# Patient Record
Sex: MALE | Race: WHITE
[De-identification: names, ages, dates, MRNs, and addresses within clinical notes are randomized per-mention and may not be internally consistent; named-entity substitution may affect disease eponyms.]

---

## 2017-02-08 ENCOUNTER — HOSPITAL ENCOUNTER (EMERGENCY)
Dept: HOSPITAL 74 - FER | Age: 82
Discharge: HOME | End: 2017-02-08
Payer: COMMERCIAL

## 2017-02-08 VITALS — TEMPERATURE: 98.4 F | DIASTOLIC BLOOD PRESSURE: 70 MMHG | HEART RATE: 95 BPM | SYSTOLIC BLOOD PRESSURE: 132 MMHG

## 2017-02-08 VITALS — BODY MASS INDEX: 25.5 KG/M2

## 2017-02-08 DIAGNOSIS — I10: ICD-10-CM

## 2017-02-08 DIAGNOSIS — J45.909: ICD-10-CM

## 2017-02-08 DIAGNOSIS — S09.90XA: Primary | ICD-10-CM

## 2017-02-08 DIAGNOSIS — Z79.82: ICD-10-CM

## 2017-02-08 DIAGNOSIS — Z87.891: ICD-10-CM

## 2017-02-08 DIAGNOSIS — I51.9: ICD-10-CM

## 2017-02-08 DIAGNOSIS — Z85.038: ICD-10-CM

## 2017-02-08 NOTE — PDOC
[id number]


FALL,HIT HEAD


Time Seen by Provider: 02/08/17 11:27


History Source: Patient


Exam Limitations: No Limitations





- History of Present Illness


Initial Comments: 


87 yo M presents s/p mechanical trip and fall from standing. He states that he 

lost his footing. He hit the back of his head, no LOC. Denies headache, weakness

, numbness, vision changes. He also notes that his knees are sore B/L, but he 

has been walking without difficulty. He cannot recall his medications, but 

chart review shows aspirin 81 mg daily. 











Past History





- Past Medical History


Allergies/Adverse Reactions: 


 Allergies











Allergy/AdvReac Type Severity Reaction Status Date / Time


 


codeine AdvReac   Verified 02/08/17 11:22











Home Medications: 


Ambulatory Orders





Aspirin [ASA -] 81 mg PO DAILY 05/30/16 


Donepezil HCl [Aricept] 10 mg PO DAILY 05/30/16 


Losartan 50Mg/Hctz 12.5MG [Hyzaar -] 1 tab PO DAILY 05/30/16 


Omeprazole [Prilosec] 40 mg PO DAILY 05/30/16 


Paroxetine HCl [Paxil -] 10 mg PO DAILY 05/30/16 








Asthma: Yes


Cancer: Yes (COLON)


Cardiac Disorders: Yes


HTN: Yes





- Immunization History


Immunization Up to Date: Yes





- Psycho/Social/Smoking Cessation Hx


Anxiety: No


Suicidal Ideation: No


Smoking History: Former smoker


Have you smoked in the past 12 months: No


If you are a former smoker, when did you quit?: 1970S


Information on smoking cessation initiated: No


Hx Alcohol Use: No


Drug/Substance Use Hx: No


Substance Use Type: None





**Review of Systems





- Review of Systems


Able to Perform ROS?: Yes


Comments:: 


GENERAL: Awake, alert, and fully oriented, in no acute distress


HEAD: No signs of trauma


EYES: PERRLA, EOMI, sclera anicteric, conjunctiva clear


ENT: Auricles normal inspection, hearing grossly normal, nares patent, 

oropharynx clear without exudates. Moist mucosa


NECK: Normal ROM, supple, no lymphadenopathy, JVD, or masses


LUNGS: Breath sounds equal, clear to auscultation bilaterally.  No wheezes, and 

no crackles


HEART: Regular rate and rhythm, normal S1 and S2, no murmurs, rubs or gallops


ABDOMEN: Soft, nontender, normoactive bowel sounds.  No guarding, no rebound.  

No masses


EXTREMITIES: Normal range of motion, no edema.  No clubbing or cyanosis. No 

cords, erythema, or tenderness


NEUROLOGICAL: Cranial nerves II through XII grossly intact.  Normal speech, 

normal gait


SKIN: Warm, Dry, normal turgor, no rashes or lesions noted.








*Physical Exam





- Vital Signs


 Last Vital Signs











Temp Pulse Resp BP Pulse Ox


 


 98.4 F   95 H  20   132/70   96 


 


 02/08/17 11:21  02/08/17 11:21  02/08/17 11:21  02/08/17 11:21  02/08/17 11:21














- Physical Exam


Comments: 


GENERAL: Awake, alert, and fully oriented, in no acute distress


HEAD: No signs of trauma


EYES: PERRLA, EOMI, sclera anicteric, conjunctiva clear


ENT: Auricles normal inspection, hearing grossly normal, nares patent, 

oropharynx clear without exudates. Moist mucosa


NECK: Normal ROM, supple, no lymphadenopathy, JVD, or masses


LUNGS: Breath sounds equal, clear to auscultation bilaterally.  No wheezes, and 

no crackles


HEART: Regular rate and rhythm, normal S1 and S2, no murmurs, rubs or gallops


ABDOMEN: Soft, nontender, normoactive bowel sounds.  No guarding, no rebound.  

No masses


EXTREMITIES: Normal range of motion, no edema.  No clubbing or cyanosis. No 

cords, erythema, or tenderness


NEUROLOGICAL: Cranial nerves II through XII grossly intact.  Normal speech. 

Uses a crutch to assist with walking.


SKIN: Warm, Dry, normal turgor, no rashes or lesions noted.








Medical Decision Making





- Medical Decision Making


CT Head- no acute findings. Stable for DC home. 








*DC/Admit/Observation/Transfer


Diagnosis at time of Disposition: 


Head injury


Qualifiers:


 Encounter type: initial encounter Qualified Code(s): S09.90XA - Unspecified 

injury of head, initial encounter





- Discharge Dispostion


Disposition: HOME


Condition at time of disposition: Stable


Admit: No





- Patient Instructions


Printed Discharge Instructions:  DI for Closed Head Injury

## 2018-01-16 ENCOUNTER — HOSPITAL ENCOUNTER (EMERGENCY)
Dept: HOSPITAL 74 - FER | Age: 83
Discharge: HOME | End: 2018-01-16
Payer: COMMERCIAL

## 2018-01-16 VITALS — BODY MASS INDEX: 32.8 KG/M2

## 2018-01-16 VITALS — TEMPERATURE: 98.2 F | DIASTOLIC BLOOD PRESSURE: 70 MMHG | HEART RATE: 66 BPM | SYSTOLIC BLOOD PRESSURE: 152 MMHG

## 2018-01-16 DIAGNOSIS — J45.909: ICD-10-CM
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
## 2018-01-16 DIAGNOSIS — I10: ICD-10-CM

## 2018-01-16 DIAGNOSIS — Z85.038: ICD-10-CM
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
## 2018-01-16 DIAGNOSIS — Z87.891: ICD-10-CM

## 2018-01-16 DIAGNOSIS — S09.90XA: Primary | ICD-10-CM
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
## 2018-01-16 PROCEDURE — 3E0234Z INTRODUCTION OF SERUM, TOXOID AND VACCINE INTO MUSCLE, PERCUTANEOUS APPROACH: ICD-10-PCS

## 2018-01-16 NOTE — PDOC
History of Present Illness





- General


History Source: Patient (Mechanical fall on steps after tripping on rock.  )


Exam Limitations: No Limitations





- History of Present Illness


Severity: moderate





<Jose Cruz Kumari - Last Filed: 01/16/18 16:23>





- General


History Source: Patient





<Darryn Desir - Last Filed: 01/16/18 17:03>





- General


Chief Complaint: Injury


Stated Complaint: FALL, HIT HEAD


Time Seen by Provider: 01/16/18 15:21





Past History





- Surgical History


Orthopedic Surgery: Yes (Bilateral knee replacement. )





<Jose Cruz Kumari - Last Filed: 01/16/18 16:23>





- Past Medical History


Asthma: Yes


Cancer: Yes (COLON)


Cardiac Disorders: Yes


HTN: Yes





- Immunization History


Immunization Up to Date: Yes





- Suicide/Smoking/Psychosocial Hx


Smoking History: Former smoker


Have you smoked in the past 12 months: No


If you are a former smoker, when did you quit?: 1970S


Hx Alcohol Use: No


Drug/Substance Use Hx: No


Substance Use Type: None





<Darryn Deisr - Last Filed: 01/16/18 17:03>





- Past Medical History


Allergies/Adverse Reactions: 


 Allergies











Allergy/AdvReac Type Severity Reaction Status Date / Time


 


codeine AdvReac   Verified 01/16/18 15:21











Home Medications: 


Ambulatory Orders





Aspirin [ASA -] 81 mg PO DAILY 05/30/16 


Donepezil HCl [Aricept] 10 mg PO DAILY 05/30/16 


Losartan 50Mg/Hctz 12.5MG [Hyzaar -] 1 tab PO DAILY 05/30/16 


Omeprazole [Prilosec] 40 mg PO DAILY 05/30/16 


Paroxetine HCl [Paxil -] 10 mg PO DAILY 05/30/16 











**Review of Systems





- Review of Systems


HEENTM: Yes: Other (Mid forehead laceration. )


All Other Systems: Reviewed and Negative





<Jose Cruz Kumari - Last Filed: 01/16/18 16:23>





*Physical Exam





- Physical Exam


HEENT: positive: Normal ENT Inspection, Other (Small Forehead laceration. )


Respiratory/Chest: positive: Lungs Clear


Cardiovascular: positive: Regular Rhythm, Regular Rate


Extremity: positive: Normal Range of Motion


Integumentary: positive: Normal Color, Dry, Warm


Neurologic: positive: CNs II-XII NML intact, Motor Strength 5/5, Other (

Slightly forgetfull. )





<Jose Cruz Kumari - Last Filed: 01/16/18 16:23>





*DC/Admit/Observation/Transfer





- Attestations


Scribe Attestion: 





01/16/18 16:29





Documentation prepared by Jose Cruz Kumari, acting as medical scribe for Darryn Desir MD/DO.





<Jose Cruz Kumari - Last Filed: 01/16/18 16:23>





- Discharge Dispostion


Admit: No





<Darryn Desir - Last Filed: 01/16/18 17:03>


Diagnosis at time of Disposition: 


Head injury


Qualifiers:


 Encounter type: initial encounter Qualified Code(s): S09.90XA - Unspecified 

injury of head, initial encounter








- Discharge Dispostion


Disposition: HOME


Condition at time of disposition: Stable





- Patient Instructions


Printed Discharge Instructions:  DI for Closed Head Injury, Diphtheria, Tetanus

, and Pertussis Vaccine

## 2018-01-18 NOTE — EKG
Test Reason : 

Blood Pressure : ***/*** mmHG

Vent. Rate : 057 BPM     Atrial Rate : 057 BPM

   P-R Int : 134 ms          QRS Dur : 090 ms

    QT Int : 398 ms       P-R-T Axes : -12 034 -01 degrees

   QTc Int : 387 ms

 

SINUS BRADYCARDIA WITH OCCASIONAL PREMATURE VENTRICULAR COMPLEXES

LOW VOLTAGE QRS

?  INFERIOR-POSTERIOR INFARCT , AGE UNDETERMINED

NO PREVIOUS ECGS AVAILABLE

Confirmed by MD ZURITA MARJORY ([id number]) on 1/18/2018 5:42:05 PM

 

Referred By:  DR. ELIAS           Confirmed By:KATHRYN ZURITA MD

## 2018-02-24 ENCOUNTER — HOSPITAL ENCOUNTER (EMERGENCY)
Dept: HOSPITAL 74 - FER | Age: 83
Discharge: HOME | End: 2018-02-24
Payer: COMMERCIAL

## 2018-02-24 VITALS — TEMPERATURE: 98.3 F | DIASTOLIC BLOOD PRESSURE: 65 MMHG | HEART RATE: 65 BPM | SYSTOLIC BLOOD PRESSURE: 152 MMHG

## 2018-02-24 VITALS — BODY MASS INDEX: 28.5 KG/M2

## 2018-02-24 DIAGNOSIS — I48.91: ICD-10-CM

## 2018-02-24 DIAGNOSIS — J45.909: ICD-10-CM

## 2018-02-24 DIAGNOSIS — E78.00: ICD-10-CM

## 2018-02-24 DIAGNOSIS — Z85.038: ICD-10-CM

## 2018-02-24 DIAGNOSIS — G89.29: ICD-10-CM

## 2018-02-24 DIAGNOSIS — M54.42: Primary | ICD-10-CM

## 2018-02-24 DIAGNOSIS — I10: ICD-10-CM

## 2018-02-24 NOTE — PDOC
History of Present Illness





- History of Present Illness


Initial Comments: 





02/24/18 20:40


Patient is an 89 M, with PMHx of chronic back pain, HTN, early signs of dementia

, colon and prostate cancer, who presents today for intermittent back pain  for 

5 days. Patient had back surgery 15 years ago and states that he gets 

occasional inflammation and pain in his back. On Tuesday his daughter states 

that he had some back pain, was better on Thursday, but has progressively 

worsened since then. He describes his back pain as left-sided, sudden onset, 

severe, radiating down his leg. His daughter states that today her mother 

called her stating that he was unable to get him into bed as well as trouble 

going from a sitting to a standing position due to his back pain. He rates his 

current back pain 3-4/10. Daughter gave patient Tylenol for the pain.  He has 

an appointment on Friday to see his pain management doctor. 





Surgical Hx: Back Sx - 15 years ago L1-S1 karen and screw placement. 











<Lorene Rooney - Last Filed: 02/24/18 20:40>





<Carmelo Olivares - Last Filed: 02/24/18 21:34>





- General


Chief Complaint: Back Pain


Stated Complaint: BACK PAIN


Time Seen by Provider: 02/24/18 20:14





Past History





<Lorene Rooney - Last Filed: 02/24/18 20:40>





- Past Medical History


Asthma: Yes


Cancer: Yes (COLON,PROSTATE)


Cardiac Disorders: Yes (AFIB)


COPD: No


Dementia: Yes


HTN: Yes


Hypercholesterolemia: Yes


Other medical history: GLAUCOMA





- Surgical History


Orthopedic Surgery: Yes (Bilateral knee replacement. )





- Immunization History


Immunization Up to Date: Yes





- Suicide/Smoking/Psychosocial Hx


Smoking History: Former smoker


Have you smoked in the past 12 months: No


If you are a former smoker, when did you quit?: 1970S


Information on smoking cessation initiated: No


Hx Alcohol Use: No


Drug/Substance Use Hx: No


Substance Use Type: None





<Carmelo Olivares - Last Filed: 02/24/18 21:34>





- Past Medical History


Allergies/Adverse Reactions: 


 Allergies











Allergy/AdvReac Type Severity Reaction Status Date / Time


 


codeine AdvReac   Verified 01/16/18 15:21











Home Medications: 


Ambulatory Orders





Aspirin [ASA -] 81 mg PO DAILY 05/30/16 


Donepezil HCl [Aricept] 10 mg PO DAILY 05/30/16 


Losartan 50Mg/Hctz 12.5MG [Hyzaar -] 1 tab PO DAILY 05/30/16 


Omeprazole [Prilosec] 40 mg PO DAILY 05/30/16 


Paroxetine HCl [Paxil -] 10 mg PO DAILY 05/30/16 


Oxycodone HCl/Acetaminophen [Percocet 5-325 mg Tablet] 1 tab PO Q4H PRN #14 

tablet MDD 6 02/24/18 











**Review of Systems





- Review of Systems


Comments:: 





02/24/18 20:40


CONSTITUTIONAL:


Absent: Fever, Chills, Diaphoresis, Generalized Weakness, Malaise, Loss of 

Appetite


HEENT:


Absent: Rhinorrhea, Nasal Congestion, Throat Pain, Throat Swelling, Difficulty 

Swallowing, Mouth Swelling, Ear Pain, Eye Pain, Visual Changes


CARDIOVASCULAR:


Absent: Chest Pain, Syncope, Palpitations, Irregular Heart Rate, Lightheadedness

, Peripheral Edema


RESPIRATORY:


Absent: Cough, Shortness of Breath, SOB with Exertion, Orthopnea, Wheezing, 

Stridor, Hemoptysis


GASTROINTESTINAL:


Absent: Abdominal pain, Abdominal Distension, Nausea, Vomiting, Diarrhea, 

Constipation, Melena, Hematochezia


GENITOURINARY:


Absent: Dysuria, Frequency, Urgency, Hesitancy, Flank Pain, Genital Pain


MUSCULOSKELETAL:


Present: left sided upper back pain radiating down leg. 


Absent: Myalgia, Arthralgia, Joint Swelling, Neck Pain


SKIN:


Absent: Rash, Itching, PalloR


HEMATOLOGIC/IMMUNOLOGIC:


Absent: Easy Bleeding, Easy Bruising, Lymphadenopathy, Frequent infections


ENDOCRINE:


Absent: Unexplained Weight Gain, Unexplained Weight Loss, Heat Intolerance, 

Cold Intolerance


NEUROLOGIC:


Absent: Headache, Focal Weakness, Paresthesias, Vertigo, Lightheadedness, 

Unsteady Gait, Seizure, Mental Status Changes, Incontinence


PSYCHIATRIC:


Absent: Anxiety, Depression








<Lorene Rooney - Last Filed: 02/24/18 20:40>





*Physical Exam





- Vital Signs


 Last Vital Signs











Temp Pulse Resp BP Pulse Ox


 


 98.3 F   65   16   152/65   100 


 


 02/24/18 20:08  02/24/18 20:08  02/24/18 20:08  02/24/18 20:08  02/24/18 20:08














- Physical Exam


Comments: 





02/24/18 20:41


GENERAL: The patient is awake, alert, and fully oriented, in no acute distress.


HEAD: Normal with no signs of trauma.


EYES: Pupils equal, round and reactive to light, extraocular movements intact, 

sclera anicteric, conjunctiva clear.


ENT: Ears normal, nares patent, oropharynx clear without exudates. Moist mucous 

membranes.


NECK: Normal range of motion, supple without lymphadenopathy, JVD, or masses.


LUNGS: Breath sounds equal, clear to auscultation bilaterally. No wheezes, and 

no crackles.


HEART: Regular rate and rhythm, normal S1 and S2 without murmur, rub or gallop.


BACK: Healed midline lumbar surgical scar


ABDOMEN: Soft, nontender, normoactive bowel sounds. No guarding, no rebound. No 

masses.


EXTREMITIES: Normal range of motion, no edema. No clubbing or cyanosis. No cords

, erythema, or tenderness.Strength 5/5. Sensation good throughout. 


NEUROLOGICAL: Cranial nerves II through XII grossly intact. Normal speech, 

normal gait.


PSYCH: Normal mood, normal affect.


SKIN: Warm, Dry, normal turgor, no rashes or lesions noted.








<Lorene Rooney - Last Filed: 02/24/18 20:40>





- Vital Signs


 Last Vital Signs











Temp Pulse Resp BP Pulse Ox


 


 98.3 F   65   16   152/65   100 


 


 02/24/18 20:08  02/24/18 20:08  02/24/18 20:08  02/24/18 20:08  02/24/18 20:08














<Carmelo Olivares - Last Filed: 02/24/18 21:34>





ED Treatment Course





- Medications


Given in the ED: 


ED Medications














Discontinued Medications














Generic Name Dose Route Start Last Admin





  Trade Name Freq  PRN Reason Stop Dose Admin


 


Oxycodone/Acetaminophen  1 combo  02/24/18 20:31  02/24/18 20:39





  Percocet 5/325 -  PO  02/24/18 20:32  1 combo





  ONCE ONE   Administration














<Lorene Rooney - Last Filed: 02/24/18 20:40>





- RADIOLOGY


Radiology Studies Ordered: 














 Category Date Time Status


 


 SPINE-LUMBAR SACRAL [RAD] Stat Radiology  02/24/18 20:31 Ordered














<Carmelo Olivares - Last Filed: 02/24/18 21:34>





Medical Decision Making





- Medical Decision Making





02/24/18 21:28


Patient is an 89-year-old man with a history of chronic lumbar spine disease 

status post surgery with rods and screws in the past.  He gets intermittent 

episodes of pain exacerbations in his lower back.  He is followed by pain 

management and has received epidural injections with good response in the past.

  Over the last 5 days he has been having intermittent spasms of low back pain 

treated the left buttock and down the left leg.  He denies any numbness or 

weakness.  He denies any change in bowel or bladder control.





On examination, there is mild lumbar and paralumbar tenderness.  He has a well-

healed surgical scar in the midline.  Careful neurological examination reveals 

full strength in both lower extremities and normal sensation throughout.





X-rays of the lumbosacral spine show hardware in good position with osteoporosis

, some old compression fractures, without gross misalignment.





Impression: Exacerbation of lumbar back pain without neurological impairment.





Plan: Patient given 1 by mouth Percocet here.  He will be given a prescription 

for Percocet for home.  He also has Tylenol at home for mild pain.  He has an 

appointment with his spine pain management doctor this coming week.  Patient 

discharged home with daughter who is a nurse.





<Carmelo Olivares - Last Filed: 02/24/18 21:34>





*DC/Admit/Observation/Transfer





- Attestations


Scribe Attestion: 





02/24/18 20:42





Documentation prepared by Lorene Rooney, acting as medical scribe for 

Carmelo Olivares MD.





<Lorene Rooney - Last Filed: 02/24/18 20:40>





- Discharge Dispostion


Admit: No





<Carmelo Olivares - Last Filed: 02/24/18 21:34>


Diagnosis at time of Disposition: 


Low back pain


Qualifiers:


 Chronicity: acute Back pain laterality: left Sciatica presence: with sciatica 

Sciatica laterality: sciatica of left side Qualified Code(s): M54.42 - Lumbago 

with sciatica, left side








- Discharge Dispostion


Disposition: HOME


Condition at time of disposition: Stable





- Prescriptions


Prescriptions: 


Oxycodone HCl/Acetaminophen [Percocet 5-325 mg Tablet] 1 tab PO Q4H PRN #14 

tablet MDD 6


 PRN Reason: severe back pain





- Patient Instructions


Printed Discharge Instructions:  DI for Back Pain With Sciatica


Additional Instructions: 


You were evaluated today for increase in low back pain radiating down the left 

leg.  Take Percocet one tablet every 4-6 hours if needed for severe pain.  Take 

Tylenol 2 tablets every 4-6 hours if needed for mild or moderate pain.  Use a 

warm heating pad to help the back pain.  Follow-up with your pain management 

doctor this week.  Return to the emergency department for any severe or 

progressive symptoms.





- Post Discharge Activity